# Patient Record
Sex: FEMALE | Race: BLACK OR AFRICAN AMERICAN | Employment: UNEMPLOYED | URBAN - METROPOLITAN AREA
[De-identification: names, ages, dates, MRNs, and addresses within clinical notes are randomized per-mention and may not be internally consistent; named-entity substitution may affect disease eponyms.]

---

## 2022-08-17 ENCOUNTER — HOSPITAL ENCOUNTER (OUTPATIENT)
Dept: PHYSICAL THERAPY | Age: 42
Discharge: HOME | End: 2022-08-17
Payer: MEDICARE

## 2022-08-17 PROCEDURE — 97110 THERAPEUTIC EXERCISES: CPT

## 2022-08-17 PROCEDURE — 97162 PT EVAL MOD COMPLEX 30 MIN: CPT

## 2022-08-17 NOTE — THERAPY EVALUATION
[address] [place] [address] PHYSICAL THERAPY  [address] [place] [address] Monika Santamaria, [address] - Phone: [phone]  Fax: [phone] / 4865 [facility]  Patient Name: Ritchie Callahan : 1980   Medical   Diagnosis: Other low back pain [M54.59] Treatment Diagnosis: Left sciatica   Onset Date: 2022     Referral Source: Autumn Ramsey MD [facility]): 2022   Prior Hospitalization: See medical history Provider #: [id number]   Prior Level of Function: independent    Comorbidities: linear IGA (autoimmune disorder), h/o PE during Covid, h/o acute tracheitis without airway obstruction, h/o increased intracranial pressure (), bullous pemphigoid   Medications: Verified on Patient Summary List   The Plan of Care and following information is based on the information from the initial evaluation.   ===========================================================================================  Assessment / key information:  Patient is a 36y.o. year old female with chief complaint of left LE pain since 2022. Patient reports she woke up with pain in her left leg and could not walk due to pain. States she was prescribed a course of prednisone which helped to decrease the shooting pains, but she continues to have constant pain in her left hip to her knee. She reports intermittent pain from her left knee to her left toes. She also reports intermittent numbness/tingling in her left toes. She reports aggravating factors include sitting with WB on left hip and left side lying. She reports relief with application of heat and stretching her left hamstrings. Functional limitations: 1) housework is limited by pain.  Objective findings: 1) unable to determine a directional preference during eval, 2) (+) SLR, 3) decreased strength in .B LE's, 4) negative piriformis test.  Patient will benefit from skilled physical therapy services to address these issues.   Thank you for this referral.          L/S ROM      Range   Effect  Strength (MMT)          Right        Left    Flex 100% NE Psoas (L2,3) 4 4   Ext 75% periph Quads (L3)       R-lat flex 50% periph Ant tib(L4)       L-lat flex 75% periph Ext Rebollar (L4,L5)       R-rot     Glut Med(L5) 4 4   L-rot     Peroneals (L5,S1)             Hamstrings(S1,S2)            Special Tests                       Right     Left          Flexibility         Right              Left    Slump     90/90 HS       SLR   + Ely       Piriformis   neg Marcial       Sl screen 3/5=70% LR     Viviana       Gaenslen test     Hip IR (prone)       Giovanni/REJI sign     Hip ER (prone)       Thigh thrust             Distraction             Lateral Compression                              Effect of:  DKC     Supine Bridge     SL Supine Bridge     Prone on Elbows     RFIS periph   LUIZ periph         Repeated side glide in standing: NE (B)  Prone on elbows with HOC right: periph  Supine passive left hip flexion: centralized pain temporarily     SI Symmetry:    Standing        Supine            Prone                Dynamic      +/- R/L  ASIS   level   Fwd Flex     IC       Stork     PSIS       Seated FF         FOTO (Focused on Therapeutic Outcomes) Functional Status score = 40/100, which corresponds to a functional limitation of 60%.  ===========================================================================================  Eval Complexity: History: HIGH Complexity :3+ comorbidities / personal factors will impact the outcome/ POC Exam:MEDIUM Complexity : 3 Standardized tests and measures addressing body structure, function, activity limitation and / or participation in recreation  Presentation: MEDIUM Complexity : Evolving with changing characteristics  Clinical Decision Making:MEDIUM Complexity : FOTO score of 26-74Overall Complexity:MEDIUM    Problem List: pain affecting function, decrease ROM, decrease strength, impaired gait/ balance, decrease ADL/ functional abilitiies, decrease activity tolerance, decrease flexibility/ joint mobility, and decrease transfer abilities   Treatment Plan may include any combination of the following: Therapeutic exercise, Therapeutic activities, Neuromuscular re-education, Physical agent/modality, Gait/balance training, Manual therapy, Patient education, and Self Care training  Patient / Family readiness to learn indicated by: asking questions, trying to perform skills, and interest  Persons(s) to be included in education: patient (P) and family support person (FSP);list son  Barriers to Learning/Limitations: None  Measures taken:    Patient Goal (s): \"Pain to stop\"   Patient self reported health status: fair  Rehabilitation Potential: good  Short Term Goals: To be accomplished in  2-4  weeks:  1. Patient will be compliant with home exercise program.  2.  Patient will report ability to sit with equal WB on B LE's without increased pain. Long Term Goals: To be accomplished in  4-8  weeks:  1. Patient will increase FOTO Functional Status score to > 55/100 to indicate decreased functional limitations. 2.  Patient will report ability to perform her usual housework duties with moderate difficulty. 3.  Patient will be independent with home exercise program for self management of symptoms. Frequency / Duration:   Patient to be seen  2-3  times per week for 4-8  weeks:  Patient / Caregiver education and instruction: self care and exercises    Therapist Signature: Cesilia Malhotra PT Date: 7/83/3835   Certification Period: 8/17/2022 - 11/16/2022 Time: 2:33 PM   ===========================================================================================  I certify that the above Physical Therapy Services are being furnished while the patient is under my care. I agree with the treatment plan and certify that this therapy is necessary.     Physician Signature:        Date:       Time: Aura Hoff MD    Please sign and return to [facility] or you may fax the signed copy to [phone]. Thank you. To ensure your patient receives the highest quality care and to avoid disruption in therapy please sign and return this plan of care within 21 days. Per Medicaid guidelines if the plan of care is not received within 21 days the patient's care must be put on hold until signed.

## 2022-08-17 NOTE — PROGRESS NOTES
PHYSICAL THERAPY - DAILY TREATMENT NOTE    Patient Name: Corrina Garfield        Date: 2022  : 1980   YES Patient  Verified  Visit #:   1    Insurance: Payor: Godfrey Earl / Plan: [id number] [address] [employer] HMO / Product Type: Managed Care Medicare /      In time: 1:18 Out time: 2:02   Total Treatment Time: 45     Medicare Time Tracking (below)   Total Timed Codes (min):  10 1:1 Treatment Time:  10     TREATMENT AREA =  Left sciatica    SUBJECTIVE    Pain Level (on 0 to 10 scale):  9  / 10   Medication Changes/New allergies or changes in medical history, any new surgeries or procedures? NO    If yes, update Summary List   Subjective Functional Status/Changes:  []  No changes reported     States she woke up in 2022 and couldn't walk because she couldn't put pressure on her left LE. States she went to her MD after 2-3 days of pain. She was prescribed prednisone and given exercises. She was also advised to take Tylenol. She states the prednisone helped but caused her \"sugar to go up. \"   She reports pain from left hip to knee is constant. She reports intermittent pain from left knee to lateral 3 toes. She reports intermittent tingling in left toes. States she has not had any shooting pains since taking the prednisone. Aggravating factors: cold weather, left S/L, sitting with WB on left side  Easing factors: heating pad, supine hamstring stretch.      TRISH: unknown         OBJECTIVE  LOW BACK EVALUATION      Previous Treatment: prednisone and exercises    PMHx:see chart    Social/Recreational/Work:housework    Pt Goals: see POC    Objective:    Gait: antalgic limp on left LE    Posture: guarded posture, increased lumbar lordosis    Palpation/Sensation: no reports of tenderness with palpation of left gluteals, piriformis, or ITB; mild reports of tenderness over left hamstring insertion and muscle belly    (N - normal; R - reduced; MR - markedly reduced)       L/S ROM Range   Effect  Strength (MMT)          Right        Left    Flex 100% NE Psoas (L2,3) 4 4   Ext 75% periph Quads (L3)     R-lat flex 50% periph Ant tib(L4)     L-lat flex 75% periph Ext Rebollar (L4,L5)     R-rot   Glut Med(L5) 4 4   L-rot   Peroneals (L5,S1)        Hamstrings(S1,S2)       Strength (MMT)       Right     Left          Gastroc (S1,S2)     Glut Max (S1,S2)          Core: Sup Bridge     Core: Side Bridge     Core: Prone plank            Special Tests                       Right     Left          Flexibility         Right              Left    Slump   90/90 HS     SLR  + Ely     Piriformis  neg Marcial     Sl screen 3/5=70% LR   Viviana     Gaenslen test   Hip IR (prone)     Giovanni/REJI sign   Hip ER (prone)     Thigh thrust        Distraction        Lateral Compression                  Effect of:  DKC    Supine Bridge    SL Supine Bridge    Prone on Elbows    RFIS periph   LUIZ periph       Repeated side glide in standing: NE (B)  Prone on elbows with HOC right: periph  Supine passive left hip flexion: centralized pain temporarily    SI Symmetry:    Standing        Supine            Prone                Dynamic      +/- R/L  ASIS  level  Fwd Flex    IC    Stork    PSIS    Seated FF      10 min Therapeutic Exercise:  [x]  See flow sheet   Rationale:      increase ROM and increase strength to improve the patients ability to perform ADL's, gait, and functional mobility with decreased pain.       min Patient Education:  YES  Reviewed HEP   []  Progressed/Changed HEP based on:   Issued HEP per handout     Other Objective/Functional Measures:    See eval     Post Treatment Pain Level (on 0 to 10) scale:   8  / 10     ASSESSMENT  Assessment/Changes in Function:     Justification for Eval Code Complexity:  Patient History (low 0, mod 1-2, high 3-4): high (linear IGA (autoimmune disorder), h/o PE during Covid, h/o acute tracheitis without airway obstruction, h/o increased intracranial pressure (2014), bullous pemphigoid)  Examination (low 1-2, mod 3+, high 4+): mod (see above)  Clinical Presentation (low stable or uncomplicated, mod evolving or changing, high unstable or unpredictable): mod  Clinical Decision Making (low , mod 26-74, high 1-25): FOTO = 40/100 mod     []  See Progress Note/Recertification   Patient will continue to benefit from skilled PT services to modify and progress therapeutic interventions, address functional mobility deficits, address ROM deficits, address strength deficits, analyze and address soft tissue restrictions, analyze and cue movement patterns, analyze and modify body mechanics/ergonomics, assess and modify postural abnormalities, and instruct in home and community integration to attain remaining goals. Progress toward goals / Updated goals:    Goals established. PLAN    [x]  Upgrade activities as tolerated YES Continue plan of care   []  Discharge due to :    []  Other:      Therapist: Amrik Candelario, PT    Date: 8/17/2022 Time: 1:25 PM     No future appointments.

## 2022-08-23 ENCOUNTER — HOSPITAL ENCOUNTER (OUTPATIENT)
Dept: PHYSICAL THERAPY | Age: 42
Discharge: HOME | End: 2022-08-23
Payer: MEDICARE

## 2022-08-23 PROCEDURE — 97110 THERAPEUTIC EXERCISES: CPT

## 2022-08-23 PROCEDURE — 97112 NEUROMUSCULAR REEDUCATION: CPT

## 2022-08-23 NOTE — PROGRESS NOTES
PHYSICAL THERAPY - DAILY TREATMENT NOTE    Patient Name: Ritchie Callahan        Date: 2022  : 1980   YES Patient  Verified  Visit #:   2   of     Insurance: Payor: Pernell Luong / Plan: Desiree Fernandez [id number] HMO / Product Type: Managed Care Medicare /      In time: 3:20early Out time: 4:08   Total Treatment Time: 48     Medicare/[facility] Time Tracking (below)   Total Timed Codes (min):  48 1:1 Treatment Time:  38     TREATMENT AREA = Other low back pain [M54.59]    SUBJECTIVE    Pain Level (on 0 to 10 scale):  8  / 10   Medication Changes/New allergies or changes in medical history, any new surgeries or procedures? NO    If yes, update Summary List   Subjective Functional Status/Changes:  []  No changes reported     \"It's down to my left foot right now.  \"          OBJECTIVE     Therapeutic Procedures:  Min Procedure Specifics + Rationale   n/a [x]  Patient Education (performed throughout session) [x] Review HEP    [] Progressed/Changed HEP based on:   [] proper performance and advancement of Therex/TA   [] reduction in pain level    [] increased functional capacity       [] change in directional preference   30 [x] Therapeutic Exercise   [x]  See Flowsheet   Rationale: increase ROM and increase strength to improve the patients ability to participate in ADL's    8 [x] Neuromuscular Re-ed   [x]  See Flowsheet    Rationale: increase ROM, increase strength, improve coordination, improve balance and increase proprioception  to improve the patients ability to safely participate in ADL's       Modality rationale: decrease inflammation, decrease pain, increase tissue extensibility and increase muscle contraction/control to improve the patients ability to perform ADL's with greater ease     Min Type Additional Details   10 [x]  Heat         [] pre-CHRIS      [x] post-CHRIS Location:C/S    [] supine             [] prone     [] legs elevated  [] legs flat  [] sitting              [] sidelying - [] left [] right   [x] Skin assessment post-treatment:  [x]intact [x]redness- no adverse reaction       []redness - adverse reaction:       Other Objective/Functional Measures:    Initiated therex per flow sheet     Post Treatment Pain Level (on 0 to 10) scale:   6  / 10     ASSESSMENT    Assessment/Changes in Function:       Tolerated treatment well without complaints of progression, indicating improved functional capacity for ADL's. Patient will continue to benefit from skilled PT services to modify and progress therapeutic interventions, address functional mobility deficits, address ROM deficits, address strength deficits, analyze and address soft tissue restrictions, analyze and cue movement patterns, analyze and modify body mechanics/ergonomics, and instruct in home and community integration  to attain remaining goals   Progress toward goals / Updated goals:    1st session since initial eval, no significant progress noted in return to function. PLAN    [x]  Upgrade activities as tolerated  [x]  Update interventions per flow sheet YES Continue plan of care   []  Discharge due to :    []  Other:      Therapist: Rosalia Issa \"BJ\" Amy, CHRISTINE, Cert. MDT, Cert. DN, Cert. SMT, Dip.  Oste[employer]ractic    Date: 8/23/2022 Time: 3:25 PM     Future Appointments   Date Time Provider Roxanna Xiong   8/23/2022  3:30 PM Pillo Pérez PT [facility]   8/24/2022  2:45 PM Radha Drake PTA [facility]   8/30/2022  2:45 PM Pillo Pérez PT [facility]   9/1/2022  1:15 PM Pillo Pérez PT [facility]   9/6/2022  2:00 PM Radha Drake PTA [facility]   9/8/2022 11:45 AM Radha Drake PTA [facility]   9/14/2022  1:15 PM Pillo Pérez PT [facility]   9/16/2022  1:15 PM Radha Drake, MANDEEP [facility]   9/20/2022  1:15 PM Pillo Pérez, PT [facility]   9/22/2022  1:15 PM Pillo Pérez, PT [facility]

## 2022-08-24 ENCOUNTER — HOSPITAL ENCOUNTER (OUTPATIENT)
Dept: PHYSICAL THERAPY | Age: 42
Discharge: HOME | End: 2022-08-24
Payer: MEDICARE

## 2022-08-24 PROCEDURE — 97110 THERAPEUTIC EXERCISES: CPT

## 2022-08-24 PROCEDURE — 97530 THERAPEUTIC ACTIVITIES: CPT

## 2022-08-24 NOTE — PROGRESS NOTES
PHYSICAL THERAPY - DAILY TREATMENT NOTE    Patient Name: Chelly David        Date: 2022  : 1980   yes Patient  Verified  Visit #:   3   of     Insurance: Payor: [employer]IC[employer] / Plan: Parvsharlene Fernandez [id number] HMO / Product Type: Managed Care Medicare /      In time: 651 Out time: 329   Total Treatment Time: 44     Medicare/[facility] Time Tracking (below)   Total Timed Codes (min):  44 1:1 Treatment Time:  44     TREATMENT AREA =  Other low back pain [M54.59]    SUBJECTIVE  Pain Level (on 0 to 10 scale):  7  / 10   Medication Changes/New allergies or changes in medical history, any new surgeries or procedures?    no  If yes, update Summary List   Subjective Functional Status/Changes:  []  No changes reported     \"I am not hurting like a 10/10 like I normally do but Its there in the butt. I did my RFISitting this morning, it helps\"          OBJECTIVE  Modalities Rationale:   PD    34 min Therapeutic Exercise:  [x]  See flow sheet   Rationale:      increase ROM, increase strength, and improve coordination to improve the patients ability to safely perform ADLs, bending/stooping/ lifting; perform prolong sitting/standing/ambulation; and negotiate stairs with no pain or limitations        10 min Therapeutic Activity: [x]  See flow sheet   Rationale:    increase ROM, increase strength, and improve coordination to improve the patients ability to perform proper posture, bed mobility and transfers without p!     Billed With/As:   [x] TE   [x] TA   [] Neuro   [] Self Care Patient Education: [x] Review HEP    [] Progressed/Changed HEP based on:   [x] positioning   [x] body mechanics   [x] transfers   [] heat/ice application    [x] other: Pt ed on importance and benefits of compliance with HEP, core strength/stability and proper posture; pt verbalized understanding       Other Objective/Functional Measures:  RFISitting- NE, REFIS- B/NE  HAYLIE- periph to left foot, returned to calf with RFIS  performed core stability TE reduced radic to left glut    VCs + demo to perform proper technique and for safety with TE  initiated HL march + TA    Reviewed proper bed mobility, sleeping positions, and importance and benefits of a neutral spine     Post Treatment Pain Level (on 0 to 10) scale:   5  / 10     ASSESSMENT  Assessment/Changes in Function:     Progressed TE without c/o increase p!  unable to abolish sxs   demos proper bed mobility with instruction     []  See Progress Note/Recertification   Patient will continue to benefit from skilled PT services to modify and progress therapeutic interventions, address functional mobility deficits, address ROM deficits, address strength deficits, analyze and address soft tissue restrictions, analyze and cue movement patterns, analyze and modify body mechanics/ergonomics, assess and modify postural abnormalities, and instruct in home and community integration to attain remaining goals. Progress toward goals / Updated goals:  Short Term Goals: To be accomplished in  2-4  weeks:  1. Patient will be compliant with home exercise program. Established HEP  2. Patient will report ability to sit with equal WB on B LE's without increased pain. Long Term Goals: To be accomplished in  4-8  weeks:  1. Patient will increase FOTO Functional Status score to > 55/100 to indicate decreased functional limitations. 2.  Patient will report ability to perform her usual housework duties with moderate difficulty. 3.  Patient will be independent with home exercise program for self management of symptoms.      PLAN  [x]  Upgrade activities as tolerated yes Continue plan of care   []  Discharge due to :    []  Other:      Therapist: Eric Washington PTA    Date: 8/24/2022 Time: 3:56 PM     Future Appointments   Date Time Provider Roxanna Xiong   8/30/2022  2:45 PM Sergio December PT [facility]   9/1/2022  1:15 PM Sergio Barksdale PT [facility]   9/6/2022  2:00 PM Sharlene Cruz PTA [facility]   9/8/2022 11:45 AM Deidre Packer PTA [facility]   9/14/2022  1:15 PM Nathaly Correa, PT [facility]   9/16/2022  1:15 PM Deidre Packer PTA [facility]   9/20/2022  1:15 PM Nathaly Correa, PT [facility]   9/22/2022  1:15 PM Nathaly Correa, PT [facility]

## 2022-09-01 ENCOUNTER — HOSPITAL ENCOUNTER (OUTPATIENT)
Dept: PHYSICAL THERAPY | Age: 42
Discharge: HOME | End: 2022-09-01
Payer: MEDICARE

## 2022-09-01 PROCEDURE — 97112 NEUROMUSCULAR REEDUCATION: CPT

## 2022-09-01 PROCEDURE — 97110 THERAPEUTIC EXERCISES: CPT

## 2022-09-01 NOTE — PROGRESS NOTES
PHYSICAL THERAPY - DAILY TREATMENT NOTE    Patient Name: Percy Valenzuela        Date: 2022  : 1980   YES Patient  Verified  Visit #:   4     Insurance: Payor: Leigh Harkins / Plan: Desiree Fernandez [id number] HMO / Product Type: Managed Care Medicare /      In time: 1:09 Out time: 2:02   Total Treatment Time: 53     Medicare/[employer] Time Tracking (below)   Total Timed Codes (min):  53 1:1 Treatment Time:  53     TREATMENT AREA = Other low back pain [M54.59]  2  SUBJECTIVE    Pain Level (on 0 to 10 scale):  6  / 10   Medication Changes/New allergies or changes in medical history, any new surgeries or procedures? NO    If yes, update Summary List   Subjective Functional Status/Changes:  []  No changes reported     \"I can't complain right now. It's not bad. I had the hardest time sleeping last night but right now it's not so bad\"          OBJECTIVE     Therapeutic Procedures:  Min Procedure Specifics + Rationale   n/a [x]  Patient Education (performed throughout session) [x] Review HEP    [] Progressed/Changed HEP based on:   [] proper performance and advancement of Therex/TA   [] reduction in pain level    [] increased functional capacity       [] change in directional preference   45 [x] Therapeutic Exercise   [x]  See Flowsheet   Rationale: increase ROM and increase strength to improve the patients ability to participate in ADL's    8 [x] Neuromuscular Re-ed   [x]  See Flowsheet    Rationale: increase ROM, increase strength, improve coordination, improve balance and increase proprioception  to improve the patients ability to safely participate in ADL's           Other Objective/Functional Measures: Added and advanced therex per flow sheet. Discussed \"BLT\" in relation to her cooking.       Post Treatment Pain Level (on 0 to 10) scale:   3  / 10     ASSESSMENT    Assessment/Changes in Function:       Performed/participated in treatment well without complaints aside from muscular fatigue/deconditioning, indicating (+) response to current course of treatment to further improve functional status. Patient will continue to benefit from skilled PT services to modify and progress therapeutic interventions, address functional mobility deficits, address ROM deficits, address strength deficits, analyze and address soft tissue restrictions, analyze and cue movement patterns, analyze and modify body mechanics/ergonomics, and instruct in home and community integration  to attain remaining goals   Progress toward goals / Updated goals:    Notable improvement in function as patient continues to reduce symptoms     PLAN    [x]  Upgrade activities as tolerated  [x]  Update interventions per flow sheet YES Continue plan of care   []  Discharge due to :    []  Other:      Therapist: Vanna Arvizu \"BJ\" CHRISTINE Reyes, Cert. MDT, Cert. DN, Cert. SMT, Dip.  Osteopractic    Date: 9/1/2022 Time: 1:25 PM     Future Appointments   Date Time Provider Roxanna Xiong   9/6/2022  2:00 PM Brady Moore [facility]   9/8/2022 11:45 AM Loraine Tinsley PTA [facility]   9/14/2022  1:15 PM Elder Laws PT [facility]   9/16/2022  1:15 PM Loraine Tinsley PTA [facility]   9/20/2022  1:15 PM Elder Laws PT [facility]   9/22/2022  1:15 PM Elder Laws PT [facility]

## 2022-09-06 ENCOUNTER — HOSPITAL ENCOUNTER (OUTPATIENT)
Dept: PHYSICAL THERAPY | Age: 42
Discharge: HOME | End: 2022-09-06
Payer: MEDICARE

## 2022-09-06 PROCEDURE — 97530 THERAPEUTIC ACTIVITIES: CPT

## 2022-09-06 PROCEDURE — 97110 THERAPEUTIC EXERCISES: CPT

## 2022-09-06 NOTE — PROGRESS NOTES
PHYSICAL THERAPY - DAILY TREATMENT NOTE    Patient Name: Eufemia Nielson        Date: 2022  : 1980   yes Patient  Verified  Visit #:  5     Insurance: Payor: Ishan Monson / Plan: Desiree Fernandez [id number] HMO / Product Type: Managed Care Medicare /      In time: 200 Out time: 250   Total Treatment Time: 50     Medicare/BCBS Time Tracking (below)   Total Timed Codes (min):  42 1:1 Treatment Time:  42     TREATMENT AREA =  Other low back pain [M54.59]    SUBJECTIVE  Pain Level (on 0 to 10 scale):  10 / 10   Medication Changes/New allergies or changes in medical history, any new surgeries or procedures?    no  If yes, update Summary List   Subjective Functional Status/Changes:  []  No changes reported     \"The pain changes all day. I am sure the stormy weather has something to do with it. I cooked for 6 hrs . I did my stretches\"          OBJECTIVE  Modalities Rationale:     decrease pain to improve patient's ability to safely perform ADLs, bending/stooping/ lifting; perform prolong sitting/standing/ambulation; and negotiate stairs with no pain or limitations   8 min []  Ice     [x]  Heat  *PRIOR to TE  location/position: Supine with wedge under B LEs   [x]Skin assessment post-treatment (if applicable):   [x]  intact    []  redness- no adverse reaction                  []redness - adverse reaction:    e reaction:          32 min Therapeutic Exercise:  [x]  See flow sheet   Rationale:      increase ROM, increase strength, and improve coordination to improve the patients ability to safely perform ADLs, bending/stooping/ lifting; perform prolong sitting/standing/ambulation; and negotiate stairs with no pain or limitations        10 min Therapeutic Activity: [x]  See flow sheet   Rationale:    increase ROM, increase strength, and improve coordination to improve the patients ability to perform proper posture, bed mobility and transfers without p!     Billed With/As:   [x] TE [x] TA   [] Neuro   [] Self Care Patient Education: [x] Review HEP    [] Progressed/Changed HEP based on:   [x] positioning   [x] body mechanics   [x] transfers   [] heat/ice application    [x] other: Pt ed on importance and benefits of compliance with HEP, core strength/stability and proper posture; pt verbalized understanding       Other Objective/Functional Measures:  RFISitting- P/B, reduced p!  applied MHP prior to TE to reduce pain  left MHP under pt during HL TE  VCs + demo to perform proper technique and for safety with TE  demos lateral trunk sway during gait  reviewed and performed GT with (I) for amb x 60' x 2 with an emphasis on symmetrical WBing and heel strike to promote proper gait     Post Treatment Pain Level (on 0 to 10) scale:  6 / 10     ASSESSMENT  Assessment/Changes in Function:   demos (I) and proper bed mobility with instruction  improved gait mechanics with instruction    []  See Progress Note/Recertification   Patient will continue to benefit from skilled PT services to modify and progress therapeutic interventions, address functional mobility deficits, address ROM deficits, address strength deficits, analyze and address soft tissue restrictions, analyze and cue movement patterns, analyze and modify body mechanics/ergonomics, assess and modify postural abnormalities, and instruct in home and community integration to attain remaining goals. Progress toward goals / Updated goals:  Short Term Goals: To be accomplished in  2-4  weeks:  1. Patient will be compliant with home exercise program. MET  2. Patient will report ability to sit with equal WB on B LE's without increased pain. Long Term Goals: To be accomplished in  4-8  weeks:  1. Patient will increase FOTO Functional Status score to > 55/100 to indicate decreased functional limitations. 2.  Patient will report ability to perform her usual housework duties with moderate difficulty.   3.  Patient will be independent with home exercise program for self management of symptoms.      PLAN  [x]  Upgrade activities as tolerated yes Continue plan of care   []  Discharge due to :    []  Other:      Therapist: Milad Champagne PTA    Date: 9/6/2022 Time: 3:10 PM     Future Appointments   Date Time Provider Roxanna Xiong   9/8/2022 11:45 AM Geraldo Craig PTA [facility]   9/14/2022  1:15 PM Indigo Scott, PT [facility]   9/16/2022  1:15 PM Geraldo Craig PTA [facility]   9/20/2022  1:15 PM Indigo Scott, PT [facility]   9/22/2022  1:15 PM Indigo Scott, PT [facility]

## 2022-09-14 ENCOUNTER — APPOINTMENT (OUTPATIENT)
Dept: PHYSICAL THERAPY | Age: 42
End: 2022-09-14
Payer: MEDICARE

## 2022-09-15 ENCOUNTER — HOSPITAL ENCOUNTER (OUTPATIENT)
Dept: PHYSICAL THERAPY | Age: 42
Discharge: HOME | End: 2022-09-15
Payer: MEDICARE

## 2022-09-15 PROCEDURE — 97110 THERAPEUTIC EXERCISES: CPT

## 2022-09-15 PROCEDURE — 97140 MANUAL THERAPY 1/> REGIONS: CPT

## 2022-09-15 PROCEDURE — 97112 NEUROMUSCULAR REEDUCATION: CPT

## 2022-09-15 PROCEDURE — 97014 ELECTRIC STIMULATION THERAPY: CPT

## 2022-09-15 NOTE — PROGRESS NOTES
PHYSICAL THERAPY - DAILY TREATMENT NOTE    Patient Name: Zain Paulson        Date: 9/15/2022  : 1980   yes Patient  Verified  Visit #:  6     Insurance: Payor: Roni Bi / Plan: Desiree Fernandez [id number] HMO / Product Type: Managed Care Medicare /      In time: 381 Out time: 430   Total Treatment Time: 55     Medicare/BCBS Time Tracking (below)   Total Timed Codes (min):  45 1:1 Treatment Time:  45     TREATMENT AREA =  Other low back pain [M54.59]    SUBJECTIVE  Pain Level (on 0 to 10 scale): 7/ 10   Medication Changes/New allergies or changes in medical history, any new surgeries or procedures?    no  If yes, update Summary List   Subjective Functional Status/Changes:  []  No changes reported     \"Today is a good day. There is no time when I am pain free.  I am doing my HEP and it helps but the pain never goes away \"          OBJECTIVE  Modalities Rationale:     decrease pain to improve patient's ability to safely perform ADLs, bending/stooping/ lifting; perform prolong sitting/standing/ambulation; and negotiate stairs with no pain or limitations     10 min [x] Estim, type/location: Biphasic to LL/s in Supine with wedge under B LEs                                       []  att     [x]  unatt     []  w/US     []  w/ice    [x]  w/heat   [x]Skin assessment post-treatment (if applicable):   [x]  intact    []  redness- no adverse reaction                  []redness - adverse reaction:    e reaction:            15 min Therapeutic Exercise:  [x]  See flow sheet   Rationale:      increase ROM, increase strength, and improve coordination to improve the patients ability to safely perform ADLs, bending/stooping/ lifting; perform prolong sitting/standing/ambulation; and negotiate stairs with no pain or limitations      20 min Manual Therapy: IAStM with FRAMs F1-5 to Left L/s/glut area, and static cupping to lumbosacral junction and glutes/piriformis and performed open book with cupping  Patient educated on purpose of Instrument Assisted Soft Tissue Mobilization, neurophysiological effects, biochemical effects, and biomechanical effects in relation to L/s condition. Pt verbalized understanding. Patient assessed for appropriateness pre-IASTM, after which consent to proceed was given. Therapist discussed post-IATSM effects and any adverse reactions (if appropriate) to determine further PT intervention through IASTM. Rationale:      decrease pain, increase ROM, increase tissue extensibility, correct positional vertigo, decrease trigger points, and increase postural awareness to improve patient's ability to safely perform ADLs, bending/stooping/ lifting; perform prolong sitting/standing/ambulation; and negotiate stairs with no pain or limitations       10 min Neuromuscular Re-ed: [x]  See flow sheet   Rationale:    increase ROM, increase strength, and improve coordination to improve the patients ability to safely perform ADLs, bending/stooping/ lifting; perform prolong sitting/standing/ambulation; and negotiate stairs with no pain or limitations       Billed With/As:   [x] TE   [x] TA   [] Neuro   [] Self Care Patient Education: [x] Review HEP    [] Progressed/Changed HEP based on:   [x] positioning   [x] body mechanics   [x] transfers   [] heat/ice application    [x] other: Pt ed on importance and benefits of compliance with HEP, core strength/stability and proper posture; pt verbalized understanding       Other Objective/Functional Measures:    VCs + demo to perform proper technique and for safety with TE  demos fair- bridge without increase p!     initiated open book with no c/o p! Post Treatment Pain Level (on 0 to 10) scale:  5 / 10     ASSESSMENT  Assessment/Changes in Function:   4/10 p! level after manual, unable to maintain after   demos symmetrical WBing in sitting; however, continues to have p!    []  See Progress Note/Recertification   Patient will continue to benefit from skilled PT services to modify and progress therapeutic interventions, address functional mobility deficits, address ROM deficits, address strength deficits, analyze and address soft tissue restrictions, analyze and cue movement patterns, analyze and modify body mechanics/ergonomics, assess and modify postural abnormalities, and instruct in home and community integration to attain remaining goals. Progress toward goals / Updated goals:  Short Term Goals: To be accomplished in  2-4  weeks:  1. Patient will be compliant with home exercise program. MET  2. Patient will report ability to sit with equal WB on B LE's without increased pain. progressing, symm WBing with p! Long Term Goals: To be accomplished in  4-8  weeks:  1. Patient will increase FOTO Functional Status score to > 55/100 to indicate decreased functional limitations. 2.  Patient will report ability to perform her usual housework duties with moderate difficulty. 3.  Patient will be independent with home exercise program for self management of symptoms.      PLAN  [x]  Upgrade activities as tolerated yes Continue plan of care   []  Discharge due to :    []  Other:      Therapist: Che Hall PTA    Date: 9/15/2022 Time: 12:40 PM     Future Appointments   Date Time Provider Roxanna Xiong   9/16/2022  1:15 PM Bro Espinoza PTA [facility]   9/20/2022  1:15 PM Noemy Glynn PT [facility]   9/22/2022  1:15 PM Noemy Glynn PT [facility]

## 2022-09-16 ENCOUNTER — HOSPITAL ENCOUNTER (OUTPATIENT)
Dept: PHYSICAL THERAPY | Age: 42
Discharge: HOME | End: 2022-09-16
Payer: MEDICARE

## 2022-09-16 PROCEDURE — 97014 ELECTRIC STIMULATION THERAPY: CPT

## 2022-09-16 PROCEDURE — 97140 MANUAL THERAPY 1/> REGIONS: CPT

## 2022-09-16 PROCEDURE — 97530 THERAPEUTIC ACTIVITIES: CPT

## 2022-09-16 PROCEDURE — 97110 THERAPEUTIC EXERCISES: CPT

## 2022-09-16 PROCEDURE — 97112 NEUROMUSCULAR REEDUCATION: CPT

## 2022-09-16 NOTE — PROGRESS NOTES
PHYSICAL THERAPY - DAILY TREATMENT NOTE    Patient Name: Lenora Sparks        Date: 2022  : 1980   yes Patient  Verified  Visit #:    Insurance: Payor: BLUE CROSS MEDICARE / Plan: Parvsharlene Fernandez [id number] HMO / Product Type: Managed Care Medicare /      In time: 115 Out time: 225   Total Treatment Time: 70       TREATMENT AREA =  Other low back pain [M54.59]    SUBJECTIVE  Pain Level (on 0 to 10 scale): 10/ 10    Medication Changes/New allergies or changes in medical history, any new surgeries or procedures?    no  If yes, update Summary List   Subjective Functional Status/Changes:  []  No changes reported     \"I was bad I was cooking all day and I didn't do my stretches\"         OBJECTIVE  Modalities Rationale:     decrease pain to improve patient's ability to safely perform ADLs, bending/stooping/ lifting; perform prolong sitting/standing/ambulation; and negotiate stairs with no pain or limitations     10 min [x] Estim, type/location: Biphasic to LL/s in Supine with wedge under B LEs                                       []  att     [x]  unatt     []  w/US     []  w/ice    [x]  w/heat   [x]Skin assessment post-treatment (if applicable):   [x]  intact    []  redness- no adverse reaction                  []redness - adverse reaction:    e reaction:            25 min Therapeutic Exercise:  [x]  See flow sheet   Rationale:      increase ROM, increase strength, and improve coordination to improve the patients ability to safely perform ADLs, bending/stooping/ lifting; perform prolong sitting/standing/ambulation; and negotiate stairs with no pain or limitations      15 min Manual Therapy: IAStM with FRAMs F1-5 to Left L/s/glut area, and static cupping to lumbosacral junction and glutes/piriformis and performed open book with cupping   Rationale:      decrease pain, increase ROM, increase tissue extensibility, correct positional vertigo, decrease trigger points, and increase postural awareness to improve patient's ability to safely perform ADLs, bending/stooping/ lifting; perform prolong sitting/standing/ambulation; and negotiate stairs with no pain or limitations     10 min Therapeutic Activity: [x]  See flow sheet   Rationale:    increase ROM, increase strength, improve coordination, improve balance, and increase proprioception to improve the patients ability to safely perform ADLs, bending/stooping/ lifting; perform prolong sitting/standing/ambulation; and negotiate stairs with no pain or limitations         10 min Neuromuscular Re-ed: [x]  See flow sheet   Rationale:    increase ROM, increase strength, and improve coordination to improve the patients ability to safely perform ADLs, bending/stooping/ lifting; perform prolong sitting/standing/ambulation; and negotiate stairs with no pain or limitations       Billed With/As:   [x] TE   [x] TA   [] Neuro   [] Self Care Patient Education: [x] Review HEP    [] Progressed/Changed HEP based on:   [x] positioning   [x] body mechanics   [x] transfers   [] heat/ice application    [x] other: Pt ed on importance and benefits of compliance with HEP, core strength/stability and proper posture; pt verbalized understanding       Other Objective/Functional Measures:    VCs + demo to perform proper technique and for safety with TE    intiated HKs, standing hip abd with min pain increase, subsided with rest    SBA for HK amb x 60' x 2  (I) for amb x 60' x 2 with an emphasis on symmetrical WBing and heel strike to promote proper gait     Post Treatment Pain Level (on 0 to 10) scale:  8/ 10     ASSESSMENT  Progressed TE without c/o increase p!    demo's decrease LLE WBing in sitting after standing hip abd, able to correct and improve after LUIZ   []  See Progress Note/Recertification   Patient will continue to benefit from skilled PT services to modify and progress therapeutic interventions, address functional mobility deficits, address ROM deficits, address strength deficits, analyze and address soft tissue restrictions, analyze and cue movement patterns, analyze and modify body mechanics/ergonomics, assess and modify postural abnormalities, and instruct in home and community integration to attain remaining goals. Progress toward goals / Updated goals:  Short Term Goals: To be accomplished in  2-4  weeks:  1. Patient will be compliant with home exercise program. MET  2. Patient will report ability to sit with equal WB on B LE's without increased pain. progressing, symm WBing with p! Long Term Goals: To be accomplished in  4-8  weeks:  1. Patient will increase FOTO Functional Status score to > 55/100 to indicate decreased functional limitations. 2.  Patient will report ability to perform her usual housework duties with moderate difficulty. 3.  Patient will be independent with home exercise program for self management of symptoms.      PLAN  [x]  Upgrade activities as tolerated yes Continue plan of care   []  Discharge due to :    []  Other:      Therapist: Annabelle Everett PTA    Date: 9/16/2022 Time: 3:21 PM     Future Appointments   Date Time Provider Roxanna Xiong   9/20/2022  1:15 PM Dottie Dumont PT [facility]   9/22/2022  1:15 PM Dottie Dumont PT [facility]

## 2022-09-20 ENCOUNTER — HOSPITAL ENCOUNTER (OUTPATIENT)
Dept: PHYSICAL THERAPY | Age: 42
Discharge: HOME | End: 2022-09-20
Payer: MEDICARE

## 2022-09-20 PROCEDURE — 97014 ELECTRIC STIMULATION THERAPY: CPT

## 2022-09-20 PROCEDURE — 97530 THERAPEUTIC ACTIVITIES: CPT

## 2022-09-20 PROCEDURE — 97112 NEUROMUSCULAR REEDUCATION: CPT

## 2022-09-20 PROCEDURE — 97110 THERAPEUTIC EXERCISES: CPT

## 2022-09-20 NOTE — PROGRESS NOTES
[facility] PHYSICAL THERAPY  [address] [place], [place] [address] [address] [place], [address] - Phone: [phone]  Fax: [phone]  PROGRESS NOTE  Patient Name: Claudetta Kindred : 1980   Treatment/Medical Diagnosis: Other low back pain [M54.59]   Referral Source: Enmanuel Veliz MD     Date of Initial Visit: 22 Attended Visits: 8 Missed Visits: 2     SUMMARY OF TREATMENT  Patient's POC has consisted of therex, therapeutic activities, manual therapy prn, modalities prn, pt. education, and a comprehensive HEP. Treatment strategies used to address functional mobility deficits, ROM deficits, strength deficits, analyze and address soft tissue restrictions, analyze and cue movement patterns, analyze and modify body mechanics/ergonomics, assess and modify postural abnormalities and instruct in home and community integration. CURRENT STATUS  Patient has had inconsistent carryover in relief. She is able to reduce pain to as low as 2/10 on VAS, but returns elevated pain level. However, she reports relief when she is able to perform her HEP as prescribed. She is limited in # of visits allowed by her insurance until the end of the year, and has agreed to DC at her last scheduled appointment. Goal/Measure of Progress Goal Met? Short Term Goals: To be accomplished in  2-4  weeks:  1. Patient will be compliant with home exercise program.  2.  Patient will report ability to sit with equal WB on B LE's without increased pain. Long Term Goals: To be accomplished in  4-8  weeks:  1. Patient will increase FOTO Functional Status score to > 55/100 to indicate decreased functional limitations. 2.  Patient will report ability to perform her usual housework duties with moderate difficulty. 3.  Patient will be independent with home exercise program for self management of symptoms.      MET    MET          MET      MET      MET       RECOMMENDATIONS  Finish last scheduled appointment to establish HEP for DC    If you have any questions/comments please contact us directly at [phone]. Thank you for allowing us to assist in the care of your patient. Therapist Signature: Brenna Zhou \"BJ\" SINA DonnellyT, Cert. MDT, Cert. DN, Cert. SMT, Dip. Osteopractic Date: 7/80/7180   Certification Period: 8/17/22-11/16/22     Reporting Period 8/17/22-9/20/22   Time: 1:25 PM   NOTE TO PHYSICIAN:  PLEASE COMPLETE THE ORDERS BELOW AND FAX TO   [facility] [place] Physical Therapy: [phone]. If you are unable to process this request in 24 hours please contact our office: [phone].    ___ I have read the above report and request that my patient continue as recommended.   ___ I have read the above report and request that my patient continue therapy with the following changes/special instructions:_________________________________________________________   ___ I have read the above report and request that my patient be discharged from therapy.      Physician Signature:        Date:       Time:                                        Diana Diego MD  g

## 2022-09-20 NOTE — PROGRESS NOTES
PHYSICAL THERAPY - DAILY TREATMENT NOTE    Patient Name: Donell Godinez        Date: 2022  : 1980   YES Patient  Verified  Visit #:   8     Insurance: Payor: Morgan Escobar / Plan: Desiree Fernandez [id number] HMO / Product Type: Managed Care Medicare /      In time: 1:10 Out time: 2:18   Total Treatment Time: 68     Medicare/BCBS Time Tracking (below)   Total Timed Codes (min):  68 1:1 Treatment Time:  53     TREATMENT AREA = Other low back pain [M54.59]    SUBJECTIVE    Pain Level (on 0 to 10 scale):  4  / 10   Medication Changes/New allergies or changes in medical history, any new surgeries or procedures? NO    If yes, update Summary List   Subjective Functional Status/Changes:  []  No changes reported     \"It's actually not as bad as it usually is. \"          OBJECTIVE     Therapeutic Procedures:  Min Procedure Specifics + Rationale   n/a [x]  Patient Education (performed throughout session) [x] Review HEP    [] Progressed/Changed HEP based on:   [] proper performance and advancement of Therex/TA   [] reduction in pain level    [] increased functional capacity       [] change in directional preference   23 [x] Therapeutic Exercise   [x]  See Flowsheet   Rationale: increase ROM and increase strength to improve the patients ability to participate in ADL's    15 [x] Therapeutic Activity   [x]  See Flowsheet    Rationale:  To improve safety, proprioception, coordination, and efficiency with tasks   15 [x] Neuromuscular Re-ed   [x]  See Flowsheet    Rationale: reeducation of movement, balance, coordination, kinesthetic sense, posture, and/or proprioception to improve the patients ability to safely participate in ADL's       Modality rationale: decrease inflammation, decrease pain, increase tissue extensibility and increase muscle contraction/control to improve the patients ability to perform ADL's with greater ease     Min Type Additional Details   15 [x] E-Stim Type:Symmetrical Biphasic  Attended? NO Location: L/S  [] supine              [] prone  [] legs elevated   [] legs flat  [] sitting   [] sidelying - [] left [] right  [] with heat  [] with ice  [] Vasopneumatic Device    [x] Skin assessment post-treatment:  [x]intact [x]redness- no adverse reaction       []redness - adverse reaction:       Other Objective/Functional Measures:    See PN     Post Treatment Pain Level (on 0 to 10) scale:   10  / 10     ASSESSMENT    Assessment/Changes in Function:       See PN         Patient will continue to benefit from skilled PT services to instruct in home and community integration  to attain remaining goals   Progress toward goals / Updated goals:    See PN     PLAN    [x]  Upgrade activities as tolerated  [x]  Update interventions per flow sheet YES Continue plan of care   []  Discharge due to :    []  Other:      Therapist: Brenna Zhou \"BJ\" CHRISTINE Reyes, Cert. MDT, Cert. DN, Cert. SMT, Dip.  Osteopractic    Date: 9/20/2022 Time: 1:19 PM     Future Appointments   Date Time Provider Roxanna Xiong   9/22/2022  1:15 PM Lynsey James, PT [facility]

## 2022-09-22 ENCOUNTER — APPOINTMENT (OUTPATIENT)
Dept: PHYSICAL THERAPY | Age: 42
End: 2022-09-22
Payer: MEDICARE

## 2022-09-27 ENCOUNTER — APPOINTMENT (OUTPATIENT)
Dept: PHYSICAL THERAPY | Age: 42
End: 2022-09-27
Payer: MEDICARE

## 2022-10-05 NOTE — PROGRESS NOTES
[facility] PHYSICAL THERAPY  [address] [place] [address] Carlos A Santamaria, [address] - Phone: [phone]  Fax: [phone]      Dear Dr. Brandt Daniels MD,   Per your referral, Valentine Palomo, 1980, was evaluated and treated for the diagnosis of Other low back pain [M54.59]. The patient has not been present for her final appointment since PN sent on 22. She is unable to reschedule due to lack of transportation, and her insurance authorization will have  by her next availability. Patient to continue with her HEP as established, and will follow up with MD should she not be able to independently address recurrence of symptoms. If you have any questions/comments please contact us directly at [phone]. Thank you for allowing us to assist in the care of your patient. Therapist Signature: Mehran Holland, DPT, Cert. MDT, Cert. DN, Cert. SMT, Dip. Osteopractic Date:    Certification Period 22-22 Time: 9:40 AM   Reporting Period 22-10/5/22     NOTE TO PHYSICIAN:  PLEASE COMPLETE THE ORDERS BELOW AND FAX TO   [facility] [place] Physical Therapy: [phone]  If you are unable to process this request in 24 hours please contact our office: [phone]    ___ I have read the above report and request that my patient continue as recommended.   ___ I have read the above report and request that my patient continue therapy with the following changes/special instructions:_________________________________________________________   ___ I have read the above report and request that my patient be discharged from therapy.      Physician Signature:        Date:       Time:                                       Brandt Daniels MD